# Patient Record
Sex: FEMALE | Race: WHITE | NOT HISPANIC OR LATINO
[De-identification: names, ages, dates, MRNs, and addresses within clinical notes are randomized per-mention and may not be internally consistent; named-entity substitution may affect disease eponyms.]

---

## 2018-06-20 ENCOUNTER — TRANSCRIPTION ENCOUNTER (OUTPATIENT)
Age: 33
End: 2018-06-20

## 2019-01-24 ENCOUNTER — APPOINTMENT (OUTPATIENT)
Dept: INTERNAL MEDICINE | Facility: CLINIC | Age: 34
End: 2019-01-24
Payer: COMMERCIAL

## 2019-01-24 VITALS
OXYGEN SATURATION: 99 % | DIASTOLIC BLOOD PRESSURE: 76 MMHG | HEART RATE: 74 BPM | SYSTOLIC BLOOD PRESSURE: 120 MMHG | TEMPERATURE: 98.2 F

## 2019-01-24 DIAGNOSIS — J00 ACUTE NASOPHARYNGITIS [COMMON COLD]: ICD-10-CM

## 2019-01-24 PROCEDURE — 99213 OFFICE O/P EST LOW 20 MIN: CPT

## 2019-01-25 ENCOUNTER — RX RENEWAL (OUTPATIENT)
Age: 34
End: 2019-01-25

## 2019-01-29 PROBLEM — J00 COLD VIRUS: Status: ACTIVE | Noted: 2019-01-29

## 2019-01-29 NOTE — PLAN
[FreeTextEntry1] : No need for antibiotics at this time. Patient is to rest and hydrate. Call if symptoms worsen.

## 2019-01-29 NOTE — HISTORY OF PRESENT ILLNESS
[FreeTextEntry1] : sinus pressure  [de-identified] : Patient had a three-day history of sinus pressure. Mild congestion mild cough. No fever no body aches or chills.

## 2019-03-15 ENCOUNTER — RX RENEWAL (OUTPATIENT)
Age: 34
End: 2019-03-15

## 2019-04-08 ENCOUNTER — RECORD ABSTRACTING (OUTPATIENT)
Age: 34
End: 2019-04-08

## 2019-04-08 DIAGNOSIS — M54.42 LUMBAGO WITH SCIATICA, LEFT SIDE: ICD-10-CM

## 2019-04-08 DIAGNOSIS — Z82.49 FAMILY HISTORY OF ISCHEMIC HEART DISEASE AND OTHER DISEASES OF THE CIRCULATORY SYSTEM: ICD-10-CM

## 2019-04-08 DIAGNOSIS — Z80.0 FAMILY HISTORY OF MALIGNANT NEOPLASM OF DIGESTIVE ORGANS: ICD-10-CM

## 2019-04-08 DIAGNOSIS — G43.909 MIGRAINE, UNSPECIFIED, NOT INTRACTABLE, W/OUT STATUS MIGRAINOSUS: ICD-10-CM

## 2019-04-09 ENCOUNTER — APPOINTMENT (OUTPATIENT)
Dept: INTERNAL MEDICINE | Facility: CLINIC | Age: 34
End: 2019-04-09
Payer: COMMERCIAL

## 2019-04-09 VITALS
HEART RATE: 76 BPM | DIASTOLIC BLOOD PRESSURE: 90 MMHG | TEMPERATURE: 98.9 F | OXYGEN SATURATION: 99 % | SYSTOLIC BLOOD PRESSURE: 140 MMHG

## 2019-04-09 PROCEDURE — 99214 OFFICE O/P EST MOD 30 MIN: CPT

## 2019-04-10 NOTE — PHYSICAL EXAM
[No Acute Distress] : no acute distress [Well Nourished] : well nourished [Well Developed] : well developed [Well-Appearing] : well-appearing [Normal Sclera/Conjunctiva] : normal sclera/conjunctiva [PERRL] : pupils equal round and reactive to light [EOMI] : extraocular movements intact [Normal Outer Ear/Nose] : the outer ears and nose were normal in appearance [Normal Oropharynx] : the oropharynx was normal [No JVD] : no jugular venous distention [Supple] : supple [No Lymphadenopathy] : no lymphadenopathy [No Respiratory Distress] : no respiratory distress  [Thyroid Normal, No Nodules] : the thyroid was normal and there were no nodules present [No Accessory Muscle Use] : no accessory muscle use [Clear to Auscultation] : lungs were clear to auscultation bilaterally [Normal Rate] : normal rate  [Regular Rhythm] : with a regular rhythm [No Murmur] : no murmur heard [Normal S1, S2] : normal S1 and S2 [No Carotid Bruits] : no carotid bruits [No Abdominal Bruit] : a ~M bruit was not heard ~T in the abdomen [Pedal Pulses Present] : the pedal pulses are present [No Varicosities] : no varicosities [No Edema] : there was no peripheral edema [No Palpable Aorta] : no palpable aorta [No Extremity Clubbing/Cyanosis] : no extremity clubbing/cyanosis [Non Tender] : non-tender [Soft] : abdomen soft [No Masses] : no abdominal mass palpated [Non-distended] : non-distended [No HSM] : no HSM [Normal Bowel Sounds] : normal bowel sounds [Normal Anterior Cervical Nodes] : no anterior cervical lymphadenopathy [Normal Posterior Cervical Nodes] : no posterior cervical lymphadenopathy [No CVA Tenderness] : no CVA  tenderness [No Spinal Tenderness] : no spinal tenderness [No Joint Swelling] : no joint swelling [No Rash] : no rash [Grossly Normal Strength/Tone] : grossly normal strength/tone [Normal Gait] : normal gait [Coordination Grossly Intact] : coordination grossly intact [Deep Tendon Reflexes (DTR)] : deep tendon reflexes were 2+ and symmetric [No Focal Deficits] : no focal deficits [Normal Insight/Judgement] : insight and judgment were intact [Normal Affect] : the affect was normal

## 2019-04-10 NOTE — HISTORY OF PRESENT ILLNESS
[FreeTextEntry1] : MEDS [de-identified] : patient is here for medical renewal and change. She no longer wishes to be on labetalol. She is no longer breast feeding

## 2020-01-10 ENCOUNTER — RX RENEWAL (OUTPATIENT)
Age: 35
End: 2020-01-10

## 2020-01-10 RX ORDER — LABETALOL HYDROCHLORIDE 200 MG/1
200 TABLET, FILM COATED ORAL
Qty: 90 | Refills: 0 | Status: DISCONTINUED | COMMUNITY
Start: 2019-03-15 | End: 2020-01-10

## 2020-01-10 RX ORDER — NIFEDIPINE 30 MG/1
30 TABLET, EXTENDED RELEASE ORAL DAILY
Qty: 1 | Refills: 0 | Status: DISCONTINUED | COMMUNITY
End: 2020-01-10

## 2020-01-13 ENCOUNTER — APPOINTMENT (OUTPATIENT)
Dept: INTERNAL MEDICINE | Facility: CLINIC | Age: 35
End: 2020-01-13
Payer: MEDICAID

## 2020-01-13 VITALS
TEMPERATURE: 98.2 F | DIASTOLIC BLOOD PRESSURE: 70 MMHG | WEIGHT: 233 LBS | BODY MASS INDEX: 37.45 KG/M2 | OXYGEN SATURATION: 98 % | HEIGHT: 66 IN | SYSTOLIC BLOOD PRESSURE: 130 MMHG | HEART RATE: 78 BPM

## 2020-01-13 VITALS — HEART RATE: 68 BPM | DIASTOLIC BLOOD PRESSURE: 82 MMHG | SYSTOLIC BLOOD PRESSURE: 135 MMHG

## 2020-01-13 PROCEDURE — 99213 OFFICE O/P EST LOW 20 MIN: CPT

## 2020-01-13 NOTE — HISTORY OF PRESENT ILLNESS
[FreeTextEntry1] : 34-year-old female here for blood pressure check. [de-identified] : The patient has been on blood pressure medications for the past 15 months, since developing high blood pressure late in her pregnancy.  She has been on the current 3 medications since that time with good control of her blood pressure.  She has had no cardiovascular symptoms, even with exertion.

## 2020-04-06 ENCOUNTER — RX RENEWAL (OUTPATIENT)
Age: 35
End: 2020-04-06

## 2020-07-21 ENCOUNTER — RX RENEWAL (OUTPATIENT)
Age: 35
End: 2020-07-21

## 2020-07-23 ENCOUNTER — RX RENEWAL (OUTPATIENT)
Age: 35
End: 2020-07-23

## 2020-07-27 ENCOUNTER — RX RENEWAL (OUTPATIENT)
Age: 35
End: 2020-07-27

## 2020-12-04 ENCOUNTER — APPOINTMENT (OUTPATIENT)
Dept: INTERNAL MEDICINE | Facility: CLINIC | Age: 35
End: 2020-12-04
Payer: COMMERCIAL

## 2020-12-04 VITALS
HEIGHT: 66 IN | WEIGHT: 224 LBS | DIASTOLIC BLOOD PRESSURE: 70 MMHG | OXYGEN SATURATION: 98 % | BODY MASS INDEX: 36 KG/M2 | SYSTOLIC BLOOD PRESSURE: 120 MMHG | HEART RATE: 72 BPM

## 2020-12-04 DIAGNOSIS — E55.9 VITAMIN D DEFICIENCY, UNSPECIFIED: ICD-10-CM

## 2020-12-04 PROCEDURE — 36415 COLL VENOUS BLD VENIPUNCTURE: CPT

## 2020-12-04 PROCEDURE — 99072 ADDL SUPL MATRL&STAF TM PHE: CPT

## 2020-12-04 PROCEDURE — 99395 PREV VISIT EST AGE 18-39: CPT | Mod: 25

## 2020-12-04 PROCEDURE — G0442 ANNUAL ALCOHOL SCREEN 15 MIN: CPT

## 2020-12-04 PROCEDURE — G0444 DEPRESSION SCREEN ANNUAL: CPT

## 2020-12-10 PROBLEM — E55.9 MILD VITAMIN D DEFICIENCY: Status: ACTIVE | Noted: 2020-12-04

## 2020-12-11 LAB
25(OH)D3 SERPL-MCNC: 18.5 NG/ML
ALBUMIN SERPL ELPH-MCNC: 4.6 G/DL
ALP BLD-CCNC: 70 U/L
ALT SERPL-CCNC: 12 U/L
ANION GAP SERPL CALC-SCNC: 9 MMOL/L
AST SERPL-CCNC: 14 U/L
BASOPHILS # BLD AUTO: 0.06 K/UL
BASOPHILS NFR BLD AUTO: 0.6 %
BILIRUB SERPL-MCNC: <0.2 MG/DL
BUN SERPL-MCNC: 10 MG/DL
CALCIUM SERPL-MCNC: 9.7 MG/DL
CHLORIDE SERPL-SCNC: 106 MMOL/L
CHOLEST SERPL-MCNC: 205 MG/DL
CO2 SERPL-SCNC: 23 MMOL/L
CREAT SERPL-MCNC: 0.62 MG/DL
EOSINOPHIL # BLD AUTO: 0.18 K/UL
EOSINOPHIL NFR BLD AUTO: 1.9 %
ESTIMATED AVERAGE GLUCOSE: 120 MG/DL
GLUCOSE SERPL-MCNC: 94 MG/DL
HBA1C MFR BLD HPLC: 5.8 %
HCT VFR BLD CALC: 43.9 %
HDLC SERPL-MCNC: 56 MG/DL
HGB BLD-MCNC: 13.4 G/DL
IMM GRANULOCYTES NFR BLD AUTO: 0.2 %
LDLC SERPL CALC-MCNC: 128 MG/DL
LYMPHOCYTES # BLD AUTO: 3.12 K/UL
LYMPHOCYTES NFR BLD AUTO: 32.2 %
MAN DIFF?: NORMAL
MCHC RBC-ENTMCNC: 26.1 PG
MCHC RBC-ENTMCNC: 30.5 GM/DL
MCV RBC AUTO: 85.4 FL
MONOCYTES # BLD AUTO: 0.84 K/UL
MONOCYTES NFR BLD AUTO: 8.7 %
NEUTROPHILS # BLD AUTO: 5.48 K/UL
NEUTROPHILS NFR BLD AUTO: 56.4 %
NONHDLC SERPL-MCNC: 150 MG/DL
PLATELET # BLD AUTO: 355 K/UL
POTASSIUM SERPL-SCNC: 3.8 MMOL/L
PROT SERPL-MCNC: 7.4 G/DL
RBC # BLD: 5.14 M/UL
RBC # FLD: 14.8 %
SARS-COV-2 IGG SERPL IA-ACNC: 0.08 INDEX
SARS-COV-2 IGG SERPL QL IA: NEGATIVE
SODIUM SERPL-SCNC: 139 MMOL/L
T4 SERPL-MCNC: 5.9 UG/DL
TRIGL SERPL-MCNC: 108 MG/DL
TSH SERPL-ACNC: 1.42 UIU/ML
VIT B12 SERPL-MCNC: 570 PG/ML
WBC # FLD AUTO: 9.7 K/UL

## 2021-01-13 ENCOUNTER — APPOINTMENT (OUTPATIENT)
Dept: OBGYN | Facility: CLINIC | Age: 36
End: 2021-01-13

## 2021-01-29 ENCOUNTER — APPOINTMENT (OUTPATIENT)
Dept: INTERNAL MEDICINE | Facility: CLINIC | Age: 36
End: 2021-01-29
Payer: COMMERCIAL

## 2021-01-29 VITALS
DIASTOLIC BLOOD PRESSURE: 78 MMHG | HEIGHT: 66 IN | WEIGHT: 224 LBS | SYSTOLIC BLOOD PRESSURE: 120 MMHG | BODY MASS INDEX: 36 KG/M2 | OXYGEN SATURATION: 98 % | HEART RATE: 76 BPM

## 2021-01-29 PROCEDURE — 99214 OFFICE O/P EST MOD 30 MIN: CPT | Mod: 25

## 2021-01-29 PROCEDURE — 36415 COLL VENOUS BLD VENIPUNCTURE: CPT

## 2021-01-29 PROCEDURE — 99072 ADDL SUPL MATRL&STAF TM PHE: CPT

## 2021-02-03 ENCOUNTER — TRANSCRIPTION ENCOUNTER (OUTPATIENT)
Age: 36
End: 2021-02-03

## 2021-02-03 LAB
ALBUMIN SERPL ELPH-MCNC: 4.6 G/DL
ALP BLD-CCNC: 92 U/L
ALT SERPL-CCNC: 15 U/L
ANION GAP SERPL CALC-SCNC: 18 MMOL/L
AST SERPL-CCNC: 11 U/L
BASOPHILS # BLD AUTO: 0.08 K/UL
BASOPHILS NFR BLD AUTO: 0.6 %
BILIRUB SERPL-MCNC: 0.2 MG/DL
BUN SERPL-MCNC: 12 MG/DL
CALCIUM SERPL-MCNC: 10.1 MG/DL
CHLORIDE SERPL-SCNC: 101 MMOL/L
CO2 SERPL-SCNC: 22 MMOL/L
CORTICOSTEROID BIND GLOBULIN: 3 MG/DL
CORTIS SERPL-MCNC: 15 UG/DL
CORTISOL, FREE: 0.8 UG/DL
CREAT SERPL-MCNC: 0.78 MG/DL
EOSINOPHIL # BLD AUTO: 0.11 K/UL
EOSINOPHIL NFR BLD AUTO: 0.8 %
GLUCOSE SERPL-MCNC: 104 MG/DL
HCT VFR BLD CALC: 46 %
HGB BLD-MCNC: 14.2 G/DL
IMM GRANULOCYTES NFR BLD AUTO: 0.3 %
LYMPHOCYTES # BLD AUTO: 3.56 K/UL
LYMPHOCYTES NFR BLD AUTO: 27.4 %
MAN DIFF?: NORMAL
MCHC RBC-ENTMCNC: 26.1 PG
MCHC RBC-ENTMCNC: 30.9 GM/DL
MCV RBC AUTO: 84.6 FL
MONOCYTES # BLD AUTO: 1.11 K/UL
MONOCYTES NFR BLD AUTO: 8.6 %
NEUTROPHILS # BLD AUTO: 8.08 K/UL
NEUTROPHILS NFR BLD AUTO: 62.3 %
PFCX: 5.3 %
PLATELET # BLD AUTO: 410 K/UL
POTASSIUM SERPL-SCNC: 4 MMOL/L
PROT SERPL-MCNC: 8.1 G/DL
RBC # BLD: 5.44 M/UL
RBC # FLD: 15.1 %
SODIUM SERPL-SCNC: 140 MMOL/L
TSH SERPL-ACNC: 1.81 UIU/ML
WBC # FLD AUTO: 12.98 K/UL

## 2021-02-05 ENCOUNTER — APPOINTMENT (OUTPATIENT)
Dept: INTERNAL MEDICINE | Facility: CLINIC | Age: 36
End: 2021-02-05
Payer: COMMERCIAL

## 2021-02-05 VITALS
HEIGHT: 66 IN | SYSTOLIC BLOOD PRESSURE: 130 MMHG | OXYGEN SATURATION: 98 % | DIASTOLIC BLOOD PRESSURE: 90 MMHG | BODY MASS INDEX: 36 KG/M2 | WEIGHT: 224 LBS | HEART RATE: 78 BPM

## 2021-02-05 DIAGNOSIS — J34.89 OTHER SPECIFIED DISORDERS OF NOSE AND NASAL SINUSES: ICD-10-CM

## 2021-02-05 DIAGNOSIS — G43.109 MIGRAINE WITH AURA, NOT INTRACTABLE, W/OUT STATUS MIGRAINOSUS: ICD-10-CM

## 2021-02-05 PROCEDURE — 99214 OFFICE O/P EST MOD 30 MIN: CPT

## 2021-02-05 PROCEDURE — 99072 ADDL SUPL MATRL&STAF TM PHE: CPT

## 2021-02-08 PROBLEM — G43.109 MIGRAINE WITH VERTIGO: Status: ACTIVE | Noted: 2021-01-29

## 2021-02-08 LAB
ALBUMIN SERPL ELPH-MCNC: 4.7 G/DL
ALP BLD-CCNC: 83 U/L
ALT SERPL-CCNC: 13 U/L
ANION GAP SERPL CALC-SCNC: 15 MMOL/L
AST SERPL-CCNC: 13 U/L
BASOPHILS # BLD AUTO: 0.07 K/UL
BASOPHILS NFR BLD AUTO: 0.6 %
BILIRUB SERPL-MCNC: 0.2 MG/DL
BUN SERPL-MCNC: 13 MG/DL
CALCIUM SERPL-MCNC: 9.8 MG/DL
CHLORIDE SERPL-SCNC: 108 MMOL/L
CO2 SERPL-SCNC: 16 MMOL/L
CREAT SERPL-MCNC: 0.76 MG/DL
EOSINOPHIL # BLD AUTO: 0.2 K/UL
EOSINOPHIL NFR BLD AUTO: 1.7 %
GLUCOSE SERPL-MCNC: 96 MG/DL
HCT VFR BLD CALC: 46.9 %
HGB BLD-MCNC: 14.5 G/DL
IMM GRANULOCYTES NFR BLD AUTO: 0.3 %
LYMPHOCYTES # BLD AUTO: 3.57 K/UL
LYMPHOCYTES NFR BLD AUTO: 30.3 %
MAN DIFF?: NORMAL
MCHC RBC-ENTMCNC: 26.3 PG
MCHC RBC-ENTMCNC: 30.9 GM/DL
MCV RBC AUTO: 85 FL
MONOCYTES # BLD AUTO: 0.74 K/UL
MONOCYTES NFR BLD AUTO: 6.3 %
NEUTROPHILS # BLD AUTO: 7.17 K/UL
NEUTROPHILS NFR BLD AUTO: 60.8 %
PLATELET # BLD AUTO: 394 K/UL
POTASSIUM SERPL-SCNC: 4.1 MMOL/L
PROT SERPL-MCNC: 7.7 G/DL
RBC # BLD: 5.52 M/UL
RBC # FLD: 15.2 %
SARS-COV-2 N GENE NPH QL NAA+PROBE: NOT DETECTED
SODIUM SERPL-SCNC: 140 MMOL/L
WBC # FLD AUTO: 11.78 K/UL

## 2021-02-21 ENCOUNTER — RESULT REVIEW (OUTPATIENT)
Age: 36
End: 2021-02-21

## 2021-03-01 ENCOUNTER — APPOINTMENT (OUTPATIENT)
Dept: INTERNAL MEDICINE | Facility: CLINIC | Age: 36
End: 2021-03-01
Payer: COMMERCIAL

## 2021-03-01 DIAGNOSIS — D72.829 ELEVATED WHITE BLOOD CELL COUNT, UNSPECIFIED: ICD-10-CM

## 2021-03-01 PROCEDURE — 36415 COLL VENOUS BLD VENIPUNCTURE: CPT

## 2021-03-01 PROCEDURE — 99072 ADDL SUPL MATRL&STAF TM PHE: CPT

## 2021-03-02 PROBLEM — D72.829 ELEVATED WBC COUNT: Status: ACTIVE | Noted: 2021-02-08

## 2021-03-03 LAB
BASOPHILS # BLD AUTO: 0.04 K/UL
BASOPHILS NFR BLD AUTO: 0.4 %
EOSINOPHIL # BLD AUTO: 0.23 K/UL
EOSINOPHIL NFR BLD AUTO: 2.1 %
HCT VFR BLD CALC: 44.3 %
HGB BLD-MCNC: 13.8 G/DL
IMM GRANULOCYTES NFR BLD AUTO: 0.3 %
LYMPHOCYTES # BLD AUTO: 3.12 K/UL
LYMPHOCYTES NFR BLD AUTO: 29 %
MAN DIFF?: NORMAL
MCHC RBC-ENTMCNC: 26.2 PG
MCHC RBC-ENTMCNC: 31.2 GM/DL
MCV RBC AUTO: 84.1 FL
MONOCYTES # BLD AUTO: 1.11 K/UL
MONOCYTES NFR BLD AUTO: 10.3 %
NEUTROPHILS # BLD AUTO: 6.23 K/UL
NEUTROPHILS NFR BLD AUTO: 57.9 %
PLATELET # BLD AUTO: 414 K/UL
RBC # BLD: 5.27 M/UL
RBC # FLD: 15.3 %
SARS-COV-2 IGG SERPL IA-ACNC: <0.1 INDEX
SARS-COV-2 IGG SERPL QL IA: NEGATIVE
WBC # FLD AUTO: 10.76 K/UL

## 2021-03-26 ENCOUNTER — APPOINTMENT (OUTPATIENT)
Dept: OBGYN | Facility: CLINIC | Age: 36
End: 2021-03-26
Payer: COMMERCIAL

## 2021-03-26 ENCOUNTER — NON-APPOINTMENT (OUTPATIENT)
Age: 36
End: 2021-03-26

## 2021-03-26 VITALS
HEIGHT: 66 IN | SYSTOLIC BLOOD PRESSURE: 160 MMHG | DIASTOLIC BLOOD PRESSURE: 80 MMHG | WEIGHT: 226 LBS | BODY MASS INDEX: 36.32 KG/M2 | TEMPERATURE: 97.9 F

## 2021-03-26 DIAGNOSIS — Z01.419 ENCOUNTER FOR GYNECOLOGICAL EXAMINATION (GENERAL) (ROUTINE) W/OUT ABNORMAL FINDINGS: ICD-10-CM

## 2021-03-26 PROCEDURE — 99072 ADDL SUPL MATRL&STAF TM PHE: CPT

## 2021-03-26 PROCEDURE — 99395 PREV VISIT EST AGE 18-39: CPT

## 2021-03-26 NOTE — HISTORY OF PRESENT ILLNESS
[TextBox_4] : 37yo P1 here for annual gyn exam.  She was last seen at postpartum exam.\par Previous c/s at 32wks at [facility] in 2018.  Baby boy, now 1yo and s/p cranial surgery for fused cranium, now doing well.\par Reg periods.\par Still working on BP control- takes nifed, HCTZ, metoprolol and working with Dr. Booker.  She is trying to exercise more and lose weight.  Plans to change to labetalol with nifed when pregn occurs.  Asking is Clomid would help her conceive sooner.\par \par She is ; stays at home with her son.

## 2021-03-26 NOTE — PLAN
[FreeTextEntry1] : Discussing timing of ovulation.  Will r/t in 3mo if not pregnant to discuss.  Enc weight loss and exercise.

## 2021-04-12 RX ORDER — METOPROLOL SUCCINATE 50 MG/1
50 TABLET, EXTENDED RELEASE ORAL DAILY
Qty: 90 | Refills: 0 | Status: DISCONTINUED | COMMUNITY
Start: 2019-04-09 | End: 2021-04-12

## 2021-04-26 ENCOUNTER — RX RENEWAL (OUTPATIENT)
Age: 36
End: 2021-04-26

## 2021-05-05 ENCOUNTER — NON-APPOINTMENT (OUTPATIENT)
Age: 36
End: 2021-05-05

## 2021-05-07 ENCOUNTER — APPOINTMENT (OUTPATIENT)
Dept: OBGYN | Facility: CLINIC | Age: 36
End: 2021-05-07
Payer: COMMERCIAL

## 2021-05-07 ENCOUNTER — ASOB RESULT (OUTPATIENT)
Age: 36
End: 2021-05-07

## 2021-05-07 DIAGNOSIS — E66.9 OBESITY, UNSPECIFIED: ICD-10-CM

## 2021-05-07 PROCEDURE — 99214 OFFICE O/P EST MOD 30 MIN: CPT

## 2021-05-07 PROCEDURE — 99072 ADDL SUPL MATRL&STAF TM PHE: CPT

## 2021-05-07 PROCEDURE — 76817 TRANSVAGINAL US OBSTETRIC: CPT

## 2021-05-07 NOTE — PLAN
[FreeTextEntry1] : EDC consistent with LMP 12/18/21. Discussed possibility of transfer again and suggested initiating care at [facility] [place] or [facility] to remove that layer of stress. Pt will consider but not inclined at this time. Discussed need for ASA as well. Referral for FTS and OB packet given. Return in 4 weeks for new OB visit. Pt wants NIPT.

## 2021-05-07 NOTE — HISTORY OF PRESENT ILLNESS
[FreeTextEntry1] : 36 year old  at 7w6d by LMP presents for pregnancy confirmation. Has nausea and limited appetite but no vomiting. Pregnancy is desired. Has history of chronic hypertension for which she is taking nifedipine, hydrochlorthiazide and labetalol. She had a   delivery at 32 weeks at [facility] for superimposed pre-eclampsia in 2018. She has noted weight loss in the pregnancy so far from 224 lbs to 208 lbs due to decreased PO intake.

## 2021-06-10 ENCOUNTER — TRANSCRIPTION ENCOUNTER (OUTPATIENT)
Age: 36
End: 2021-06-10

## 2021-06-11 ENCOUNTER — APPOINTMENT (OUTPATIENT)
Dept: OBGYN | Facility: CLINIC | Age: 36
End: 2021-06-11
Payer: COMMERCIAL

## 2021-06-11 ENCOUNTER — NON-APPOINTMENT (OUTPATIENT)
Age: 36
End: 2021-06-11

## 2021-06-11 VITALS
HEIGHT: 66 IN | WEIGHT: 213 LBS | SYSTOLIC BLOOD PRESSURE: 124 MMHG | BODY MASS INDEX: 34.23 KG/M2 | DIASTOLIC BLOOD PRESSURE: 80 MMHG | TEMPERATURE: 97.7 F

## 2021-06-11 DIAGNOSIS — Z11.59 ENCOUNTER FOR SCREENING FOR OTHER VIRAL DISEASES: ICD-10-CM

## 2021-06-11 LAB
CYTOLOGY CVX/VAG DOC THIN PREP: NORMAL
HPV HIGH+LOW RISK DNA PNL CVX: NOT DETECTED

## 2021-06-11 PROCEDURE — 0500F INITIAL PRENATAL CARE VISIT: CPT

## 2021-06-14 ENCOUNTER — APPOINTMENT (OUTPATIENT)
Dept: OBGYN | Facility: CLINIC | Age: 36
End: 2021-06-14

## 2021-06-14 LAB
APPEARANCE: CLEAR
BACTERIA UR CULT: NORMAL
BACTERIA: NEGATIVE
BASOPHILS # BLD AUTO: 0.04 K/UL
BASOPHILS NFR BLD AUTO: 0.4 %
BILIRUBIN URINE: NEGATIVE
BLOOD URINE: NEGATIVE
C TRACH RRNA SPEC QL NAA+PROBE: NOT DETECTED
COLOR: YELLOW
COVID-19 NUCLEOCAPSID  GAM ANTIBODY INTERPRETATION: NEGATIVE
EOSINOPHIL # BLD AUTO: 0.11 K/UL
EOSINOPHIL NFR BLD AUTO: 1.1 %
GLUCOSE QUALITATIVE U: NEGATIVE
HBV SURFACE AG SER QL: NONREACTIVE
HCT VFR BLD CALC: 40.1 %
HGB BLD-MCNC: 12.6 G/DL
HIV1+2 AB SPEC QL IA.RAPID: NONREACTIVE
HYALINE CASTS: 1 /LPF
IMM GRANULOCYTES NFR BLD AUTO: 0.2 %
KETONES URINE: NEGATIVE
LEUKOCYTE ESTERASE URINE: NEGATIVE
LYMPHOCYTES # BLD AUTO: 2.06 K/UL
LYMPHOCYTES NFR BLD AUTO: 21.2 %
MAN DIFF?: NORMAL
MCHC RBC-ENTMCNC: 26.5 PG
MCHC RBC-ENTMCNC: 31.4 GM/DL
MCV RBC AUTO: 84.4 FL
MEV IGG FLD QL IA: 192 AU/ML
MEV IGG+IGM SER-IMP: POSITIVE
MICROSCOPIC-UA: NORMAL
MONOCYTES # BLD AUTO: 0.83 K/UL
MONOCYTES NFR BLD AUTO: 8.5 %
N GONORRHOEA RRNA SPEC QL NAA+PROBE: NOT DETECTED
NEUTROPHILS # BLD AUTO: 6.65 K/UL
NEUTROPHILS NFR BLD AUTO: 68.6 %
NITRITE URINE: NEGATIVE
PH URINE: 5
PLATELET # BLD AUTO: 377 K/UL
PROTEIN URINE: NEGATIVE
RBC # BLD: 4.75 M/UL
RBC # FLD: 15.1 %
RED BLOOD CELLS URINE: 1 /HPF
RUBV IGG FLD-ACNC: 4.8 INDEX
RUBV IGG SER-IMP: POSITIVE
SARS-COV-2 AB SERPL QL IA: 0.1 INDEX
SOURCE AMPLIFICATION: NORMAL
SPECIFIC GRAVITY URINE: 1.02
SQUAMOUS EPITHELIAL CELLS: 2 /HPF
T PALLIDUM AB SER QL IA: NEGATIVE
UROBILINOGEN URINE: NORMAL
WBC # FLD AUTO: 9.71 K/UL
WHITE BLOOD CELLS URINE: 1 /HPF

## 2021-06-15 ENCOUNTER — RX RENEWAL (OUTPATIENT)
Age: 36
End: 2021-06-15

## 2021-06-22 ENCOUNTER — NON-APPOINTMENT (OUTPATIENT)
Age: 36
End: 2021-06-22

## 2021-06-23 ENCOUNTER — NON-APPOINTMENT (OUTPATIENT)
Age: 36
End: 2021-06-23

## 2021-07-15 ENCOUNTER — NON-APPOINTMENT (OUTPATIENT)
Age: 36
End: 2021-07-15

## 2021-07-15 ENCOUNTER — APPOINTMENT (OUTPATIENT)
Dept: OBGYN | Facility: CLINIC | Age: 36
End: 2021-07-15
Payer: COMMERCIAL

## 2021-07-15 VITALS
TEMPERATURE: 97.7 F | BODY MASS INDEX: 33.59 KG/M2 | WEIGHT: 209 LBS | HEIGHT: 66 IN | DIASTOLIC BLOOD PRESSURE: 80 MMHG | SYSTOLIC BLOOD PRESSURE: 120 MMHG

## 2021-07-15 PROCEDURE — 0502F SUBSEQUENT PRENATAL CARE: CPT

## 2021-07-15 PROCEDURE — 36415 COLL VENOUS BLD VENIPUNCTURE: CPT

## 2021-07-27 ENCOUNTER — RX RENEWAL (OUTPATIENT)
Age: 36
End: 2021-07-27

## 2021-08-09 ENCOUNTER — NON-APPOINTMENT (OUTPATIENT)
Age: 36
End: 2021-08-09

## 2021-08-13 ENCOUNTER — APPOINTMENT (OUTPATIENT)
Dept: OBGYN | Facility: CLINIC | Age: 36
End: 2021-08-13
Payer: COMMERCIAL

## 2021-08-13 VITALS
WEIGHT: 214 LBS | BODY MASS INDEX: 34.39 KG/M2 | SYSTOLIC BLOOD PRESSURE: 120 MMHG | TEMPERATURE: 98.6 F | DIASTOLIC BLOOD PRESSURE: 80 MMHG | HEIGHT: 66 IN

## 2021-08-13 PROCEDURE — 0502F SUBSEQUENT PRENATAL CARE: CPT

## 2021-08-19 LAB
2ND TRIMESTER DATA: NORMAL
AFP PNL SERPL: NORMAL
AFP SERPL-ACNC: NORMAL
BILIRUB UR QL STRIP: NORMAL
CLINICAL BIOCHEMIST REVIEW: NORMAL
GLUCOSE UR-MCNC: NORMAL
HCG UR QL: 0.2 EU/DL
HGB UR QL STRIP.AUTO: NORMAL
KETONES UR-MCNC: NORMAL
LEUKOCYTE ESTERASE UR QL STRIP: NORMAL
NITRITE UR QL STRIP: NORMAL
NOTES NTD: NORMAL
PH UR STRIP: 7.5
PROT UR STRIP-MCNC: NORMAL
SP GR UR STRIP: 1.03

## 2021-09-03 ENCOUNTER — APPOINTMENT (OUTPATIENT)
Dept: OBGYN | Facility: CLINIC | Age: 36
End: 2021-09-03
Payer: COMMERCIAL

## 2021-09-03 ENCOUNTER — NON-APPOINTMENT (OUTPATIENT)
Age: 36
End: 2021-09-03

## 2021-09-03 VITALS
SYSTOLIC BLOOD PRESSURE: 110 MMHG | BODY MASS INDEX: 35.03 KG/M2 | DIASTOLIC BLOOD PRESSURE: 70 MMHG | WEIGHT: 218 LBS | HEIGHT: 66 IN

## 2021-09-03 LAB
BILIRUB UR QL STRIP: NEGATIVE
GLUCOSE UR-MCNC: NEGATIVE
HCG UR QL: 0.2 EU/DL
HGB UR QL STRIP.AUTO: NEGATIVE
KETONES UR-MCNC: NEGATIVE
LEUKOCYTE ESTERASE UR QL STRIP: NEGATIVE
NITRITE UR QL STRIP: NEGATIVE
PH UR STRIP: 5.5
PROT UR STRIP-MCNC: NEGATIVE
SP GR UR STRIP: 1.01

## 2021-09-03 PROCEDURE — 0502F SUBSEQUENT PRENATAL CARE: CPT

## 2021-09-03 PROCEDURE — 81015 MICROSCOPIC EXAM OF URINE: CPT

## 2021-09-04 LAB
BASOPHILS # BLD AUTO: 0.03 K/UL
BASOPHILS NFR BLD AUTO: 0.3 %
EOSINOPHIL # BLD AUTO: 0.09 K/UL
EOSINOPHIL NFR BLD AUTO: 0.8 %
GLUCOSE 1H P 50 G GLC PO SERPL-MCNC: 112 MG/DL
HCT VFR BLD CALC: 37 %
HGB BLD-MCNC: 11.8 G/DL
IMM GRANULOCYTES NFR BLD AUTO: 0.4 %
LYMPHOCYTES # BLD AUTO: 1.7 K/UL
LYMPHOCYTES NFR BLD AUTO: 15.7 %
MAN DIFF?: NORMAL
MCHC RBC-ENTMCNC: 27 PG
MCHC RBC-ENTMCNC: 31.9 GM/DL
MCV RBC AUTO: 84.7 FL
MONOCYTES # BLD AUTO: 0.74 K/UL
MONOCYTES NFR BLD AUTO: 6.9 %
NEUTROPHILS # BLD AUTO: 8.2 K/UL
NEUTROPHILS NFR BLD AUTO: 75.9 %
PLATELET # BLD AUTO: 339 K/UL
RBC # BLD: 4.37 M/UL
RBC # FLD: 14.4 %
WBC # FLD AUTO: 10.8 K/UL

## 2021-09-20 ENCOUNTER — NON-APPOINTMENT (OUTPATIENT)
Age: 36
End: 2021-09-20

## 2021-09-27 ENCOUNTER — APPOINTMENT (OUTPATIENT)
Dept: OBGYN | Facility: CLINIC | Age: 36
End: 2021-09-27
Payer: COMMERCIAL

## 2021-09-27 VITALS
WEIGHT: 220 LBS | DIASTOLIC BLOOD PRESSURE: 70 MMHG | TEMPERATURE: 98.4 F | SYSTOLIC BLOOD PRESSURE: 120 MMHG | BODY MASS INDEX: 35.36 KG/M2 | HEIGHT: 66 IN

## 2021-09-27 PROCEDURE — 0502F SUBSEQUENT PRENATAL CARE: CPT

## 2021-10-04 ENCOUNTER — APPOINTMENT (OUTPATIENT)
Dept: OBGYN | Facility: CLINIC | Age: 36
End: 2021-10-04

## 2021-10-11 ENCOUNTER — NON-APPOINTMENT (OUTPATIENT)
Age: 36
End: 2021-10-11

## 2021-10-22 ENCOUNTER — APPOINTMENT (OUTPATIENT)
Dept: OBGYN | Facility: CLINIC | Age: 36
End: 2021-10-22
Payer: COMMERCIAL

## 2021-10-22 VITALS
HEIGHT: 66 IN | WEIGHT: 222 LBS | DIASTOLIC BLOOD PRESSURE: 70 MMHG | SYSTOLIC BLOOD PRESSURE: 122 MMHG | BODY MASS INDEX: 35.68 KG/M2

## 2021-10-22 LAB
BILIRUB UR QL STRIP: NORMAL
GLUCOSE UR-MCNC: NORMAL
HCG UR QL: 0.2 EU/DL
HGB UR QL STRIP.AUTO: NORMAL
KETONES UR-MCNC: NORMAL
LEUKOCYTE ESTERASE UR QL STRIP: NORMAL
NITRITE UR QL STRIP: NORMAL
PH UR STRIP: 6
PROT UR STRIP-MCNC: NORMAL
SP GR UR STRIP: 1.01

## 2021-10-22 PROCEDURE — 90715 TDAP VACCINE 7 YRS/> IM: CPT

## 2021-10-22 PROCEDURE — 36415 COLL VENOUS BLD VENIPUNCTURE: CPT

## 2021-10-22 PROCEDURE — 90471 IMMUNIZATION ADMIN: CPT

## 2021-10-22 PROCEDURE — 0502F SUBSEQUENT PRENATAL CARE: CPT

## 2021-10-22 RX ORDER — OXYCODONE AND ACETAMINOPHEN TABLETS 10; 300 MG/1; MG/1
TABLET ORAL
Refills: 0 | Status: DISCONTINUED | COMMUNITY
End: 2021-10-22

## 2021-10-22 RX ORDER — DOXYLAMINE SUCCINATE AND PYRIDOXINE HYDROCHLORIDE 10; 10 MG/1; MG/1
10-10 TABLET, DELAYED RELEASE ORAL
Qty: 60 | Refills: 2 | Status: DISCONTINUED | COMMUNITY
Start: 2021-05-08 | End: 2021-10-22

## 2021-10-22 RX ORDER — HYDROCHLOROTHIAZIDE 12.5 MG/1
12.5 CAPSULE ORAL
Qty: 30 | Refills: 2 | Status: DISCONTINUED | COMMUNITY
Start: 2020-04-06 | End: 2021-10-22

## 2021-10-22 RX ORDER — IBUPROFEN 600 MG/1
600 TABLET, FILM COATED ORAL
Refills: 0 | Status: DISCONTINUED | COMMUNITY
End: 2021-10-22

## 2021-10-22 RX ORDER — ACETAMINOPHEN 325 MG/1
325 TABLET, FILM COATED ORAL
Refills: 0 | Status: DISCONTINUED | COMMUNITY
End: 2021-10-22

## 2021-10-22 RX ORDER — AMOXICILLIN AND CLAVULANATE POTASSIUM 875; 125 MG/1; MG/1
875-125 TABLET, COATED ORAL
Qty: 14 | Refills: 0 | Status: DISCONTINUED | COMMUNITY
Start: 2021-02-05 | End: 2021-10-22

## 2021-10-25 LAB
COVID-19 NUCLEOCAPSID  GAM ANTIBODY INTERPRETATION: NEGATIVE
HIV1+2 AB SPEC QL IA.RAPID: NONREACTIVE
SARS-COV-2 AB SERPL QL IA: 0.07 INDEX
T PALLIDUM AB SER QL IA: NEGATIVE

## 2021-11-01 ENCOUNTER — APPOINTMENT (OUTPATIENT)
Dept: OBGYN | Facility: CLINIC | Age: 36
End: 2021-11-01

## 2021-11-05 ENCOUNTER — APPOINTMENT (OUTPATIENT)
Dept: OBGYN | Facility: CLINIC | Age: 36
End: 2021-11-05
Payer: COMMERCIAL

## 2021-11-05 VITALS
SYSTOLIC BLOOD PRESSURE: 100 MMHG | BODY MASS INDEX: 36.8 KG/M2 | WEIGHT: 229 LBS | HEIGHT: 66 IN | DIASTOLIC BLOOD PRESSURE: 70 MMHG

## 2021-11-05 LAB
BILIRUB UR QL STRIP: NORMAL
CLARITY UR: CLEAR
COLLECTION METHOD: NORMAL
GLUCOSE UR-MCNC: NEGATIVE
HCG UR QL: 0.2 EU/DL
HGB UR QL STRIP.AUTO: NEGATIVE
KETONES UR-MCNC: NORMAL
LEUKOCYTE ESTERASE UR QL STRIP: NEGATIVE
NITRITE UR QL STRIP: NEGATIVE
PH UR STRIP: 5.5
PROT UR STRIP-MCNC: NORMAL
SP GR UR STRIP: >=1.03

## 2021-11-05 PROCEDURE — 90471 IMMUNIZATION ADMIN: CPT

## 2021-11-05 PROCEDURE — 0502F SUBSEQUENT PRENATAL CARE: CPT

## 2021-11-05 PROCEDURE — 90686 IIV4 VACC NO PRSV 0.5 ML IM: CPT

## 2021-11-16 ENCOUNTER — NON-APPOINTMENT (OUTPATIENT)
Age: 36
End: 2021-11-16

## 2021-11-19 ENCOUNTER — APPOINTMENT (OUTPATIENT)
Dept: OBGYN | Facility: CLINIC | Age: 36
End: 2021-11-19
Payer: COMMERCIAL

## 2021-11-19 ENCOUNTER — NON-APPOINTMENT (OUTPATIENT)
Age: 36
End: 2021-11-19

## 2021-11-19 VITALS
SYSTOLIC BLOOD PRESSURE: 130 MMHG | HEIGHT: 66 IN | BODY MASS INDEX: 37.28 KG/M2 | TEMPERATURE: 98.4 F | DIASTOLIC BLOOD PRESSURE: 80 MMHG | WEIGHT: 232 LBS

## 2021-11-19 LAB
ABO + RH PNL BLD: NORMAL
BILIRUB UR QL STRIP: NORMAL
BLD GP AB SCN SERPL QL: NORMAL
CFTR MUT TESTED BLD/T: NEGATIVE
CLARIM 15Q11.2: NORMAL
CLARIM 1P36: NORMAL
CLARIM 22Q11.2: NORMAL
CLARIM 4P-/WOLF-HIRSCHHORN: NORMAL
CLARIM 5P-/CRI DU CHAT: NORMAL
CLARIM ADDITIONAL INFO: NORMAL
CLARIM CHROMOSOME 13: NORMAL
CLARIM CHROMOSOME 18: NORMAL
CLARIM CHROMOSOME 21: NORMAL
CLARIM SEX CHROMOSOMES: NORMAL
CLARITEST NIPT W/MICRO: NORMAL
GLUCOSE UR-MCNC: NORMAL
HCG UR QL: 0.2 EU/DL
HGB A MFR BLD: 97.5 %
HGB A2 MFR BLD: 2.5 %
HGB FRACT BLD-IMP: NORMAL
HGB UR QL STRIP.AUTO: NORMAL
KETONES UR-MCNC: 15
KETONES UR-MCNC: NORMAL
KETONES UR-MCNC: NORMAL
LEUKOCYTE ESTERASE UR QL STRIP: NORMAL
NITRITE UR QL STRIP: NORMAL
PH UR STRIP: 5
PH UR STRIP: 5
PH UR STRIP: 5.5
PROT UR STRIP-MCNC: NORMAL
SP GR UR STRIP: 1.03

## 2021-11-19 PROCEDURE — 76819 FETAL BIOPHYS PROFIL W/O NST: CPT

## 2021-11-19 PROCEDURE — 0502F SUBSEQUENT PRENATAL CARE: CPT

## 2021-11-22 ENCOUNTER — NON-APPOINTMENT (OUTPATIENT)
Age: 36
End: 2021-11-22

## 2021-11-23 ENCOUNTER — NON-APPOINTMENT (OUTPATIENT)
Age: 36
End: 2021-11-23

## 2021-11-23 ENCOUNTER — APPOINTMENT (OUTPATIENT)
Dept: OBGYN | Facility: CLINIC | Age: 36
End: 2021-11-23
Payer: COMMERCIAL

## 2021-11-23 VITALS
DIASTOLIC BLOOD PRESSURE: 72 MMHG | SYSTOLIC BLOOD PRESSURE: 122 MMHG | WEIGHT: 232 LBS | BODY MASS INDEX: 37.28 KG/M2 | HEIGHT: 66 IN

## 2021-11-23 LAB — B-HEM STREP SPEC QL CULT: NORMAL

## 2021-11-23 PROCEDURE — 81003 URINALYSIS AUTO W/O SCOPE: CPT | Mod: QW

## 2021-11-23 PROCEDURE — 0502F SUBSEQUENT PRENATAL CARE: CPT

## 2021-11-24 ENCOUNTER — NON-APPOINTMENT (OUTPATIENT)
Age: 36
End: 2021-11-24

## 2021-11-26 ENCOUNTER — NON-APPOINTMENT (OUTPATIENT)
Age: 36
End: 2021-11-26

## 2021-11-29 ENCOUNTER — APPOINTMENT (OUTPATIENT)
Dept: OBGYN | Facility: CLINIC | Age: 36
End: 2021-11-29

## 2021-11-30 ENCOUNTER — APPOINTMENT (OUTPATIENT)
Dept: OBGYN | Facility: CLINIC | Age: 36
End: 2021-11-30
Payer: COMMERCIAL

## 2021-11-30 VITALS
SYSTOLIC BLOOD PRESSURE: 120 MMHG | TEMPERATURE: 98.7 F | WEIGHT: 234 LBS | BODY MASS INDEX: 37.61 KG/M2 | HEIGHT: 66 IN | DIASTOLIC BLOOD PRESSURE: 70 MMHG

## 2021-11-30 DIAGNOSIS — Z01.818 ENCOUNTER FOR OTHER PREPROCEDURAL EXAMINATION: ICD-10-CM

## 2021-11-30 LAB
BILIRUB UR QL STRIP: NORMAL
CLARITY UR: CLEAR
COLLECTION METHOD: NORMAL
GLUCOSE UR-MCNC: NORMAL
HCG UR QL: 0.2 EU/DL
HGB UR QL STRIP.AUTO: NORMAL
KETONES UR-MCNC: NORMAL
LEUKOCYTE ESTERASE UR QL STRIP: NORMAL
NITRITE UR QL STRIP: NORMAL
PH UR STRIP: 5.5
PROT UR STRIP-MCNC: NORMAL
SP GR UR STRIP: >=1.03

## 2021-11-30 PROCEDURE — 59025 FETAL NON-STRESS TEST: CPT

## 2021-11-30 PROCEDURE — 0502F SUBSEQUENT PRENATAL CARE: CPT

## 2021-12-02 ENCOUNTER — NON-APPOINTMENT (OUTPATIENT)
Age: 36
End: 2021-12-02

## 2021-12-04 ENCOUNTER — NON-APPOINTMENT (OUTPATIENT)
Age: 36
End: 2021-12-04

## 2021-12-06 ENCOUNTER — NON-APPOINTMENT (OUTPATIENT)
Age: 36
End: 2021-12-06

## 2021-12-08 ENCOUNTER — APPOINTMENT (OUTPATIENT)
Dept: OBGYN | Facility: CLINIC | Age: 36
End: 2021-12-08
Payer: COMMERCIAL

## 2021-12-08 VITALS
DIASTOLIC BLOOD PRESSURE: 80 MMHG | SYSTOLIC BLOOD PRESSURE: 117 MMHG | WEIGHT: 238 LBS | BODY MASS INDEX: 38.25 KG/M2 | HEIGHT: 66 IN

## 2021-12-08 LAB
BILIRUB UR QL STRIP: NORMAL
GLUCOSE UR-MCNC: NORMAL
HCG UR QL: 0.2 EU/DL
HGB UR QL STRIP.AUTO: NORMAL
KETONES UR-MCNC: NORMAL
LEUKOCYTE ESTERASE UR QL STRIP: NORMAL
NITRITE UR QL STRIP: NORMAL
PH UR STRIP: 5
PROT UR STRIP-MCNC: NORMAL
SP GR UR STRIP: 1.02

## 2021-12-08 PROCEDURE — 59025 FETAL NON-STRESS TEST: CPT

## 2021-12-08 PROCEDURE — 0502F SUBSEQUENT PRENATAL CARE: CPT

## 2021-12-11 ENCOUNTER — NON-APPOINTMENT (OUTPATIENT)
Age: 36
End: 2021-12-11

## 2021-12-12 ENCOUNTER — RESULT REVIEW (OUTPATIENT)
Age: 36
End: 2021-12-12

## 2021-12-13 ENCOUNTER — APPOINTMENT (OUTPATIENT)
Dept: OBGYN | Facility: HOSPITAL | Age: 36
End: 2021-12-13
Payer: COMMERCIAL

## 2021-12-13 PROCEDURE — 59510 CESAREAN DELIVERY: CPT | Mod: GC

## 2021-12-15 ENCOUNTER — APPOINTMENT (OUTPATIENT)
Dept: OBGYN | Facility: CLINIC | Age: 36
End: 2021-12-15

## 2021-12-17 ENCOUNTER — APPOINTMENT (OUTPATIENT)
Dept: OBGYN | Facility: CLINIC | Age: 36
End: 2021-12-17

## 2021-12-17 ENCOUNTER — NON-APPOINTMENT (OUTPATIENT)
Age: 36
End: 2021-12-17

## 2021-12-18 LAB
BILIRUB UR QL STRIP: NORMAL
GLUCOSE UR-MCNC: NORMAL
HCG UR QL: 0.2 EU/DL
HGB UR QL STRIP.AUTO: NORMAL
KETONES UR-MCNC: NORMAL
LEUKOCYTE ESTERASE UR QL STRIP: NORMAL
NITRITE UR QL STRIP: NORMAL
PH UR STRIP: 5
PROT UR STRIP-MCNC: NORMAL
SP GR UR STRIP: 1.03

## 2021-12-28 ENCOUNTER — APPOINTMENT (OUTPATIENT)
Dept: INTERNAL MEDICINE | Facility: CLINIC | Age: 36
End: 2021-12-28

## 2022-01-06 ENCOUNTER — APPOINTMENT (OUTPATIENT)
Dept: OBGYN | Facility: CLINIC | Age: 37
End: 2022-01-06
Payer: COMMERCIAL

## 2022-01-06 VITALS
HEIGHT: 66 IN | DIASTOLIC BLOOD PRESSURE: 80 MMHG | BODY MASS INDEX: 35.52 KG/M2 | SYSTOLIC BLOOD PRESSURE: 120 MMHG | WEIGHT: 221 LBS

## 2022-01-06 PROCEDURE — 99213 OFFICE O/P EST LOW 20 MIN: CPT

## 2022-01-06 NOTE — PHYSICAL EXAM
[Appropriately responsive] : appropriately responsive [Alert] : alert [No Acute Distress] : no acute distress [Soft] : soft [Non-tender] : non-tender [Non-distended] : non-distended [No HSM] : No HSM [No Lesions] : no lesions [No Mass] : no mass [Oriented x3] : oriented x3 [FreeTextEntry7] : Incision clean, dry, intact

## 2022-01-06 NOTE — PLAN
[FreeTextEntry1] : Contraception discussed in detail. Discussed that in context of HTN, OCPs are not recommended. LARC would be safest option for her but pt is not comfortable with having a device inside. Discussed partner vasectomy, he has previously declined. Encouraged her to revisit the discussion and provided information for urologist so he can have a consultation for information as well. Offered medicaid form so that she can have tubal ligation in the future, she declines for now. Will start progesterone only pill at this time.

## 2022-01-06 NOTE — HISTORY OF PRESENT ILLNESS
[FreeTextEntry1] : s/p repeat  on . Overall feeling well. \par Does not require pain medication anymore and vaginal bleeding is decreasing. \par No concern for depression at this time. \par Did not have tubal ligation because she didn't have the medicaid form signed; she declines signing it today. \par Re: contraception, does not want IUD and worried about weight gain with the pill. Would consider laparoscopic tubal ligation but not yet.

## 2022-01-18 ENCOUNTER — RX RENEWAL (OUTPATIENT)
Age: 37
End: 2022-01-18

## 2022-01-27 ENCOUNTER — APPOINTMENT (OUTPATIENT)
Dept: OBGYN | Facility: CLINIC | Age: 37
End: 2022-01-27
Payer: COMMERCIAL

## 2022-01-27 VITALS
DIASTOLIC BLOOD PRESSURE: 70 MMHG | WEIGHT: 215 LBS | BODY MASS INDEX: 34.55 KG/M2 | HEIGHT: 66 IN | SYSTOLIC BLOOD PRESSURE: 110 MMHG

## 2022-01-27 DIAGNOSIS — Z34.80 ENCOUNTER FOR SUPERVISION OF OTHER NORMAL PREGNANCY, UNSPECIFIED TRIMESTER: ICD-10-CM

## 2022-01-27 DIAGNOSIS — Z3A.17 17 WEEKS GESTATION OF PREGNANCY: ICD-10-CM

## 2022-01-27 DIAGNOSIS — O21.9 VOMITING OF PREGNANCY, UNSPECIFIED: ICD-10-CM

## 2022-01-27 DIAGNOSIS — O16.1 UNSPECIFIED MATERNAL HYPERTENSION, FIRST TRIMESTER: ICD-10-CM

## 2022-01-27 DIAGNOSIS — Z34.01 ENCOUNTER FOR SUPERVISION OF NORMAL FIRST PREGNANCY, FIRST TRIMESTER: ICD-10-CM

## 2022-01-27 DIAGNOSIS — Z32.00 ENCOUNTER FOR PREGNANCY TEST, RESULT UNKNOWN: ICD-10-CM

## 2022-01-27 DIAGNOSIS — Z31.430 ENCOUNTER OF FEMALE FOR TESTING FOR GENETIC DISEASE CARRIER STATUS FOR PROCREATIVE MANAGEMENT: ICD-10-CM

## 2022-01-27 DIAGNOSIS — Z3A.36 36 WEEKS GESTATION OF PREGNANCY: ICD-10-CM

## 2022-01-27 DIAGNOSIS — Z64.1 PROBLEMS RELATED TO MULTIPARITY: ICD-10-CM

## 2022-01-27 DIAGNOSIS — Z48.89 ENCOUNTER FOR OTHER SPECIFIED SURGICAL AFTERCARE: ICD-10-CM

## 2022-01-27 DIAGNOSIS — Z3A.28 28 WEEKS GESTATION OF PREGNANCY: ICD-10-CM

## 2022-01-27 DIAGNOSIS — Z3A.24 24 WEEKS GESTATION OF PREGNANCY: ICD-10-CM

## 2022-01-27 PROCEDURE — 0503F POSTPARTUM CARE VISIT: CPT

## 2022-01-27 NOTE — HISTORY OF PRESENT ILLNESS
[Complications:___] : complications include: [unfilled] [Last Pap Date: ___] : Last Pap Date: [unfilled] [Repeat C/S] : delivered by  section (repeat) [Male] : Delivery History: baby boy [Wt. ___] : weighing [unfilled] [Delivery Date: ___] : on [unfilled] [Intended Contraception] : Intended Contraception: [Clean/Dry/Intact] : clean, dry and intact [Healed] : healed [Back to Normal] : is back to normal in size [Normal] : the vagina was normal [Examination Of The Breasts] : breasts are normal [Doing Well] : is doing well [None] : None [BTL] : no tubal ligation [Breastfeeding] : not currently nursing [Resumed Menses] : has not resumed her menses [Resumed Ovid] : has not resumed intercourse [Breast Pain] : no breast pain [Heavy Bleeding] : no heavy bleeding [Incisional Pain] : no incisional pain [Cervix Sample Taken] : cervical sample not taken for a Pap smear [de-identified] : POP

## 2022-04-11 PROBLEM — Z11.59 SCREENING FOR VIRAL DISEASE: Status: ACTIVE | Noted: 2021-06-11

## 2022-05-24 ENCOUNTER — RX RENEWAL (OUTPATIENT)
Age: 37
End: 2022-05-24

## 2022-10-05 ENCOUNTER — APPOINTMENT (OUTPATIENT)
Dept: INTERNAL MEDICINE | Facility: CLINIC | Age: 37
End: 2022-10-05

## 2022-10-05 PROCEDURE — 99214 OFFICE O/P EST MOD 30 MIN: CPT

## 2022-10-05 RX ORDER — NIFEDIPINE 90 MG/1
90 TABLET, EXTENDED RELEASE ORAL
Qty: 90 | Refills: 3 | Status: DISCONTINUED | COMMUNITY
Start: 2019-01-25 | End: 2022-10-05

## 2022-10-05 RX ORDER — LABETALOL HYDROCHLORIDE 200 MG/1
200 TABLET, FILM COATED ORAL
Qty: 90 | Refills: 2 | Status: DISCONTINUED | COMMUNITY
Start: 2021-04-12 | End: 2022-10-05

## 2022-10-05 RX ORDER — NIFEDIPINE 90 MG/1
90 TABLET, EXTENDED RELEASE ORAL
Qty: 30 | Refills: 11 | Status: DISCONTINUED | COMMUNITY
Start: 2022-01-18 | End: 2022-10-05

## 2022-10-06 VITALS — DIASTOLIC BLOOD PRESSURE: 80 MMHG | SYSTOLIC BLOOD PRESSURE: 150 MMHG

## 2022-10-06 NOTE — HISTORY OF PRESENT ILLNESS
[FreeTextEntry8] : Patient is here to follow-up on her blood pressure.  She after having COVID has stopped all her blood pressure medications.  She was on\par Procardia and labetalol.  She was pregnant at the time.  However now she is off of all medication.  She does not check her blood pressure regularly at home.

## 2023-05-09 ENCOUNTER — RX RENEWAL (OUTPATIENT)
Age: 38
End: 2023-05-09

## 2023-10-12 DIAGNOSIS — J03.00 ACUTE STREPTOCOCCAL TONSILLITIS, UNSPECIFIED: ICD-10-CM

## 2023-10-25 ENCOUNTER — NON-APPOINTMENT (OUTPATIENT)
Age: 38
End: 2023-10-25

## 2023-11-09 ENCOUNTER — APPOINTMENT (OUTPATIENT)
Dept: FAMILY MEDICINE | Facility: CLINIC | Age: 38
End: 2023-11-09
Payer: COMMERCIAL

## 2023-11-09 VITALS
HEIGHT: 66 IN | TEMPERATURE: 99.1 F | OXYGEN SATURATION: 97 % | BODY MASS INDEX: 40.82 KG/M2 | HEART RATE: 105 BPM | RESPIRATION RATE: 18 BRPM | SYSTOLIC BLOOD PRESSURE: 138 MMHG | WEIGHT: 254 LBS | DIASTOLIC BLOOD PRESSURE: 84 MMHG

## 2023-11-09 DIAGNOSIS — J06.9 ACUTE UPPER RESPIRATORY INFECTION, UNSPECIFIED: ICD-10-CM

## 2023-11-09 DIAGNOSIS — J02.9 ACUTE PHARYNGITIS, UNSPECIFIED: ICD-10-CM

## 2023-11-09 PROCEDURE — 36415 COLL VENOUS BLD VENIPUNCTURE: CPT

## 2023-11-09 PROCEDURE — 87880 STREP A ASSAY W/OPTIC: CPT | Mod: QW

## 2023-11-09 PROCEDURE — 99213 OFFICE O/P EST LOW 20 MIN: CPT | Mod: 25

## 2023-11-10 LAB
BASOPHILS # BLD AUTO: 0.07 K/UL
BASOPHILS NFR BLD AUTO: 0.8 %
EBV EA AB SER IA-ACNC: <5 U/ML
EBV EA AB TITR SER IF: POSITIVE
EBV EA IGG SER QL IA: >600 U/ML
EBV EA IGG SER-ACNC: NEGATIVE
EBV EA IGM SER IA-ACNC: NEGATIVE
EBV PATRN SPEC IB-IMP: NORMAL
EBV VCA IGG SER IA-ACNC: 691 U/ML
EBV VCA IGM SER QL IA: <10 U/ML
EOSINOPHIL # BLD AUTO: 0.22 K/UL
EOSINOPHIL NFR BLD AUTO: 2.6 %
EPSTEIN-BARR VIRUS CAPSID ANTIGEN IGG: POSITIVE
HCT VFR BLD CALC: 42.1 %
HGB BLD-MCNC: 13.1 G/DL
IMM GRANULOCYTES NFR BLD AUTO: 0.4 %
LYMPHOCYTES # BLD AUTO: 1.68 K/UL
LYMPHOCYTES NFR BLD AUTO: 19.7 %
MAN DIFF?: NORMAL
MCHC RBC-ENTMCNC: 24.8 PG
MCHC RBC-ENTMCNC: 31.1 GM/DL
MCV RBC AUTO: 79.6 FL
MONOCYTES # BLD AUTO: 0.87 K/UL
MONOCYTES NFR BLD AUTO: 10.2 %
NEUTROPHILS # BLD AUTO: 5.64 K/UL
NEUTROPHILS NFR BLD AUTO: 66.3 %
PLATELET # BLD AUTO: 350 K/UL
RBC # BLD: 5.29 M/UL
RBC # FLD: 17.1 %
WBC # FLD AUTO: 8.51 K/UL

## 2023-11-12 LAB — BACTERIA THROAT CULT: NORMAL

## 2023-11-17 ENCOUNTER — RX RENEWAL (OUTPATIENT)
Age: 38
End: 2023-11-17

## 2023-11-24 ENCOUNTER — NON-APPOINTMENT (OUTPATIENT)
Age: 38
End: 2023-11-24

## 2023-11-29 ENCOUNTER — APPOINTMENT (OUTPATIENT)
Dept: INTERNAL MEDICINE | Facility: CLINIC | Age: 38
End: 2023-11-29
Payer: COMMERCIAL

## 2023-11-29 VITALS
OXYGEN SATURATION: 98 % | DIASTOLIC BLOOD PRESSURE: 80 MMHG | BODY MASS INDEX: 40.82 KG/M2 | HEIGHT: 66 IN | WEIGHT: 254 LBS | HEART RATE: 90 BPM | SYSTOLIC BLOOD PRESSURE: 148 MMHG | TEMPERATURE: 97.2 F | RESPIRATION RATE: 16 BRPM

## 2023-11-29 DIAGNOSIS — R10.9 UNSPECIFIED ABDOMINAL PAIN: ICD-10-CM

## 2023-11-29 PROCEDURE — 36415 COLL VENOUS BLD VENIPUNCTURE: CPT

## 2023-11-29 PROCEDURE — 99213 OFFICE O/P EST LOW 20 MIN: CPT | Mod: 25

## 2023-11-30 LAB
ALBUMIN SERPL ELPH-MCNC: 4.2 G/DL
ALP BLD-CCNC: 81 U/L
ALT SERPL-CCNC: 17 U/L
ANION GAP SERPL CALC-SCNC: 13 MMOL/L
AST SERPL-CCNC: 12 U/L
BASOPHILS # BLD AUTO: 0.06 K/UL
BASOPHILS NFR BLD AUTO: 0.7 %
BILIRUB SERPL-MCNC: <0.2 MG/DL
BUN SERPL-MCNC: 10 MG/DL
CALCIUM SERPL-MCNC: 9.4 MG/DL
CHLORIDE SERPL-SCNC: 105 MMOL/L
CO2 SERPL-SCNC: 21 MMOL/L
CREAT SERPL-MCNC: 0.57 MG/DL
EGFR: 119 ML/MIN/1.73M2
EOSINOPHIL # BLD AUTO: 0.17 K/UL
EOSINOPHIL NFR BLD AUTO: 2.1 %
GLUCOSE SERPL-MCNC: 107 MG/DL
HCT VFR BLD CALC: 40.4 %
HGB BLD-MCNC: 12.4 G/DL
IMM GRANULOCYTES NFR BLD AUTO: 0.4 %
LYMPHOCYTES # BLD AUTO: 2.62 K/UL
LYMPHOCYTES NFR BLD AUTO: 31.8 %
MAN DIFF?: NORMAL
MCHC RBC-ENTMCNC: 24.4 PG
MCHC RBC-ENTMCNC: 30.7 GM/DL
MCV RBC AUTO: 79.5 FL
MONOCYTES # BLD AUTO: 0.68 K/UL
MONOCYTES NFR BLD AUTO: 8.2 %
NEUTROPHILS # BLD AUTO: 4.69 K/UL
NEUTROPHILS NFR BLD AUTO: 56.8 %
PLATELET # BLD AUTO: 387 K/UL
POTASSIUM SERPL-SCNC: 4.1 MMOL/L
PROT SERPL-MCNC: 7.3 G/DL
RBC # BLD: 5.08 M/UL
RBC # FLD: 16.4 %
SODIUM SERPL-SCNC: 139 MMOL/L
WBC # FLD AUTO: 8.25 K/UL

## 2023-12-04 ENCOUNTER — RESULT REVIEW (OUTPATIENT)
Age: 38
End: 2023-12-04

## 2023-12-08 ENCOUNTER — RESULT CHARGE (OUTPATIENT)
Age: 38
End: 2023-12-08

## 2023-12-22 ENCOUNTER — APPOINTMENT (OUTPATIENT)
Dept: INTERNAL MEDICINE | Facility: CLINIC | Age: 38
End: 2023-12-22
Payer: COMMERCIAL

## 2023-12-22 VITALS
RESPIRATION RATE: 16 BRPM | HEART RATE: 88 BPM | BODY MASS INDEX: 40.18 KG/M2 | HEIGHT: 66 IN | SYSTOLIC BLOOD PRESSURE: 156 MMHG | OXYGEN SATURATION: 98 % | WEIGHT: 250 LBS | TEMPERATURE: 97.8 F | DIASTOLIC BLOOD PRESSURE: 90 MMHG

## 2023-12-22 DIAGNOSIS — Z00.00 ENCOUNTER FOR GENERAL ADULT MEDICAL EXAMINATION W/OUT ABNORMAL FINDINGS: ICD-10-CM

## 2023-12-22 PROCEDURE — 99395 PREV VISIT EST AGE 18-39: CPT | Mod: 25

## 2023-12-22 PROCEDURE — 36415 COLL VENOUS BLD VENIPUNCTURE: CPT

## 2023-12-29 PROBLEM — J06.9 VIRAL URI WITH COUGH: Status: RESOLVED | Noted: 2023-11-09 | Resolved: 2023-12-09

## 2023-12-29 PROBLEM — J02.9 ACUTE PHARYNGITIS, UNSPECIFIED ETIOLOGY: Status: ACTIVE | Noted: 2023-11-09

## 2023-12-29 LAB — S PYO AG SPEC QL IA: NEGATIVE

## 2023-12-29 NOTE — HISTORY OF PRESENT ILLNESS
[FreeTextEntry8] :  38-year-old female presents with history of sore throat x 2 days without fever or chills. (+)"Cold" symptoms and nonproductive cough. Patient has 2 small children at home ages 2 and 5 with positive throat cultures for streptococcal pharyngitis within the last few days. Patient reports no prior history of acute mononucleosis. Non-smoker.

## 2023-12-29 NOTE — REVIEW OF SYSTEMS
[Fatigue] : fatigue [Nasal Discharge] : nasal discharge [Sore Throat] : sore throat [Postnasal Drip] : postnasal drip [Cough] : cough [Negative] : Heme/Lymph [Fever] : no fever [Chills] : no chills [Recent Change In Weight] : ~T no recent weight change [Earache] : no earache [Hoarseness] : no hoarseness [Chest Pain] : no chest pain [Wheezing] : no wheezing [Shortness Of Breath] : no shortness of breath [Dyspnea on Exertion] : no dyspnea on exertion [Abdominal Pain] : no abdominal pain [Nausea] : no nausea [Hematuria] : no hematuria [Skin Rash] : no skin rash [Swollen Glands] : no swollen glands

## 2023-12-29 NOTE — PLAN
[FreeTextEntry1] : 1)-Treatment plan as outlined above. 2)-Laboratory specimens drawn in the clinic.

## 2024-01-02 ENCOUNTER — TRANSCRIPTION ENCOUNTER (OUTPATIENT)
Age: 39
End: 2024-01-02

## 2024-01-02 LAB
25(OH)D3 SERPL-MCNC: 17.2 NG/ML
ALBUMIN SERPL ELPH-MCNC: 4.3 G/DL
ALP BLD-CCNC: 90 U/L
ALT SERPL-CCNC: 14 U/L
ANION GAP SERPL CALC-SCNC: 12 MMOL/L
APPEARANCE: CLEAR
AST SERPL-CCNC: 15 U/L
BASOPHILS # BLD AUTO: 0.07 K/UL
BASOPHILS NFR BLD AUTO: 0.8 %
BILIRUB SERPL-MCNC: <0.2 MG/DL
BILIRUBIN URINE: NEGATIVE
BLOOD URINE: NEGATIVE
BUN SERPL-MCNC: 11 MG/DL
CALCIUM SERPL-MCNC: 9.5 MG/DL
CHLORIDE SERPL-SCNC: 105 MMOL/L
CHOLEST SERPL-MCNC: 228 MG/DL
CO2 SERPL-SCNC: 24 MMOL/L
COLOR: YELLOW
CREAT SERPL-MCNC: 1.04 MG/DL
EGFR: 71 ML/MIN/1.73M2
EOSINOPHIL # BLD AUTO: 0.25 K/UL
EOSINOPHIL NFR BLD AUTO: 3 %
ESTIMATED AVERAGE GLUCOSE: 114 MG/DL
GLUCOSE QUALITATIVE U: NEGATIVE MG/DL
GLUCOSE SERPL-MCNC: 99 MG/DL
HBA1C MFR BLD HPLC: 5.6 %
HCT VFR BLD CALC: 40.5 %
HDLC SERPL-MCNC: 62 MG/DL
HGB BLD-MCNC: 12.3 G/DL
IMM GRANULOCYTES NFR BLD AUTO: 0.2 %
KETONES URINE: NEGATIVE MG/DL
LDLC SERPL CALC-MCNC: 144 MG/DL
LEUKOCYTE ESTERASE URINE: NEGATIVE
LYMPHOCYTES # BLD AUTO: 2.4 K/UL
LYMPHOCYTES NFR BLD AUTO: 28.7 %
MAN DIFF?: NORMAL
MCHC RBC-ENTMCNC: 24.4 PG
MCHC RBC-ENTMCNC: 30.4 GM/DL
MCV RBC AUTO: 80.4 FL
MONOCYTES # BLD AUTO: 0.55 K/UL
MONOCYTES NFR BLD AUTO: 6.6 %
NEUTROPHILS # BLD AUTO: 5.08 K/UL
NEUTROPHILS NFR BLD AUTO: 60.7 %
NITRITE URINE: NEGATIVE
NONHDLC SERPL-MCNC: 166 MG/DL
PH URINE: 5.5
PLATELET # BLD AUTO: 394 K/UL
POTASSIUM SERPL-SCNC: 4.1 MMOL/L
PROT SERPL-MCNC: 7.4 G/DL
PROTEIN URINE: NEGATIVE MG/DL
RBC # BLD: 5.04 M/UL
RBC # FLD: 16 %
SODIUM SERPL-SCNC: 140 MMOL/L
SPECIFIC GRAVITY URINE: 1.02
T4 SERPL-MCNC: 6.2 UG/DL
TRIGL SERPL-MCNC: 124 MG/DL
TSH SERPL-ACNC: 1.64 UIU/ML
UROBILINOGEN URINE: 0.2 MG/DL
VIT B12 SERPL-MCNC: 650 PG/ML
WBC # FLD AUTO: 8.37 K/UL

## 2024-01-02 NOTE — HEALTH RISK ASSESSMENT
[Very Good] : ~his/her~  mood as very good [Yes] : Yes [Monthly or less (1 pt)] : Monthly or less (1 point) [3 or 4 (1 pt)] : 3 or 4  (1 point) [Never (0 pts)] : Never (0 points) [No] : In the past 12 months have you used drugs other than those required for medical reasons? No [No falls in past year] : Patient reported no falls in the past year [0] : 2) Feeling down, depressed, or hopeless: Not at all (0) [PHQ-2 Negative - No further assessment needed] : PHQ-2 Negative - No further assessment needed [Patient reported PAP Smear was normal] : Patient reported PAP Smear was normal [Fully functional (bathing, dressing, toileting, transferring, walking, feeding)] : Fully functional (bathing, dressing, toileting, transferring, walking, feeding) [Fully functional (using the telephone, shopping, preparing meals, housekeeping, doing laundry, using] : Fully functional and needs no help or supervision to perform IADLs (using the telephone, shopping, preparing meals, housekeeping, doing laundry, using transportation, managing medications and managing finances) [Never] : Never [Audit-CScore] : 2 [[id number]] : 0 [Smoke Detector] : smoke detector [Safety elements used in home] : safety elements used in home [PapSmearDate] : 01/22

## 2024-01-02 NOTE — HISTORY OF PRESENT ILLNESS
[FreeTextEntry1] : Annual exam.  And [de-identified] : Patient presents to the office today for an annual exam.  Currently feels fine.  We had a long extensive discussion about weight loss.  Because abdominal ultrasound showed gallstones with fatty liver disease.  Follows OB/GYN on a regular basis.  Denies any chest pains denies any abdominal pains.

## 2024-02-23 ENCOUNTER — APPOINTMENT (OUTPATIENT)
Dept: OBGYN | Facility: CLINIC | Age: 39
End: 2024-02-23
Payer: COMMERCIAL

## 2024-02-23 VITALS
BODY MASS INDEX: 39.7 KG/M2 | HEIGHT: 66 IN | WEIGHT: 247 LBS | SYSTOLIC BLOOD PRESSURE: 124 MMHG | DIASTOLIC BLOOD PRESSURE: 80 MMHG

## 2024-02-23 DIAGNOSIS — Z01.419 ENCOUNTER FOR GYNECOLOGICAL EXAMINATION (GENERAL) (ROUTINE) W/OUT ABNORMAL FINDINGS: ICD-10-CM

## 2024-02-23 PROCEDURE — 99395 PREV VISIT EST AGE 18-39: CPT

## 2024-02-23 NOTE — HISTORY OF PRESENT ILLNESS
[TextBox_4] : 37yo P2 here for annual gyn. H/o Htn, currently on Amlodipine. Followed for GB issues- not sure if it's stone and has follow up with GI currently. Reg periods without heavy bleeding.  Now is having spotting for a day at ovulation.  She is ; her boys are now 2, 6yo.  She works evenings in a restaurant.

## 2024-02-26 LAB — HPV HIGH+LOW RISK DNA PNL CVX: NOT DETECTED

## 2024-02-27 LAB — CYTOLOGY CVX/VAG DOC THIN PREP: NORMAL

## 2024-02-29 ENCOUNTER — APPOINTMENT (OUTPATIENT)
Dept: GASTROENTEROLOGY | Facility: CLINIC | Age: 39
End: 2024-02-29
Payer: COMMERCIAL

## 2024-02-29 VITALS
OXYGEN SATURATION: 97 % | WEIGHT: 247 LBS | HEART RATE: 86 BPM | DIASTOLIC BLOOD PRESSURE: 82 MMHG | SYSTOLIC BLOOD PRESSURE: 132 MMHG | BODY MASS INDEX: 39.7 KG/M2 | RESPIRATION RATE: 16 BRPM | HEIGHT: 66 IN

## 2024-02-29 PROCEDURE — 99203 OFFICE O/P NEW LOW 30 MIN: CPT

## 2024-02-29 NOTE — ASSESSMENT
[FreeTextEntry1] : Will refer to surgery for cholecystectomy, symptomatic cholelithiasis.  Thank you for referring Ms. DUVALL.  Please do not hesitate to call to further discuss his/her care.

## 2024-02-29 NOTE — HISTORY OF PRESENT ILLNESS
[FreeTextEntry1] : Ms. Cole is a pleasant 38F h/o obesity, HTN, vertigo,  who is referred by Dr. Booker for RUQ pain. She had an episode of RUQ pain after eating that radiated around to her back in later 2023. Went to urgent care, eventually underwent an abd U/S on 23 showing: - cholelithiasis without cholecystitis - fatty liver  She comes today to discuss further plans. No jaundice, icterus. No fevers, chills. Has been losing weight through dietary changes. Otherwise feels well. Does not smoke or drink. No FHx of any GI malignancies.

## 2024-03-01 ENCOUNTER — RX RENEWAL (OUTPATIENT)
Age: 39
End: 2024-03-01

## 2024-03-01 RX ORDER — AMLODIPINE BESYLATE 5 MG/1
5 TABLET ORAL DAILY
Qty: 30 | Refills: 2 | Status: ACTIVE | COMMUNITY
Start: 2022-10-05 | End: 1900-01-01

## 2024-03-27 ENCOUNTER — APPOINTMENT (OUTPATIENT)
Dept: SURGERY | Facility: CLINIC | Age: 39
End: 2024-03-27
Payer: COMMERCIAL

## 2024-03-27 VITALS
HEIGHT: 66 IN | TEMPERATURE: 97.9 F | HEART RATE: 81 BPM | OXYGEN SATURATION: 98 % | DIASTOLIC BLOOD PRESSURE: 99 MMHG | WEIGHT: 248 LBS | SYSTOLIC BLOOD PRESSURE: 156 MMHG | BODY MASS INDEX: 39.86 KG/M2

## 2024-03-27 PROCEDURE — 99203 OFFICE O/P NEW LOW 30 MIN: CPT

## 2024-03-29 NOTE — PHYSICAL EXAM
[de-identified] : Patient looks well nonicteric no distress currently [de-identified] : Nonicteric [de-identified] : Soft nontender no masses [de-identified] : Protuberant abdomen no upper abdominal scars

## 2024-03-29 NOTE — REASON FOR VISIT
[Initial Evaluation] : an initial evaluation [FreeTextEntry1] : Patient being evaluated for symptomatic cholelithiasis

## 2024-03-29 NOTE — PLAN
[FreeTextEntry1] : Plan for laparoscopic cholecystectomy I reviewed the risk and benefits of the procedure informed consent was obtained I spent approximately 35 minutes with the patient today in the office face-to-face time reviewing her documentation and documenting her visit

## 2024-03-29 NOTE — HISTORY OF PRESENT ILLNESS
[de-identified] : Patient is a 39-year-old woman who around [facility] of last year began to experience bandlike abdominal pain in the epigastrium she went to an urgent care they diagnosed her with gallstones and there was no evidence of cholecystitis pain went away on its own and it is come back only intermittently when she has dietary indiscretions she has ever had any jaundiced nor any pancreatitis  Her BMI is 39  Plan is for a robotic assisted laparoscopic cholecystectomy on April 12

## 2024-03-29 NOTE — ASSESSMENT
[FreeTextEntry1] : My impression is patient has symptomatic cholelithiasis.  She would be an excellent candidate for robotic assisted laparoscopic cholecystectomy  She will get preoperative liver function tests we will plan on doing her on 4/12/24  She will be seen by presurgical testing

## 2024-04-02 ENCOUNTER — RESULT REVIEW (OUTPATIENT)
Age: 39
End: 2024-04-02

## 2024-04-11 ENCOUNTER — RESULT REVIEW (OUTPATIENT)
Age: 39
End: 2024-04-11

## 2024-04-12 ENCOUNTER — RESULT REVIEW (OUTPATIENT)
Age: 39
End: 2024-04-12

## 2024-04-12 ENCOUNTER — APPOINTMENT (OUTPATIENT)
Dept: SURGERY | Facility: HOSPITAL | Age: 39
End: 2024-04-12

## 2024-04-12 ENCOUNTER — TRANSCRIPTION ENCOUNTER (OUTPATIENT)
Age: 39
End: 2024-04-12

## 2024-04-24 ENCOUNTER — APPOINTMENT (OUTPATIENT)
Dept: SURGERY | Facility: CLINIC | Age: 39
End: 2024-04-24
Payer: COMMERCIAL

## 2024-04-24 VITALS
HEART RATE: 86 BPM | SYSTOLIC BLOOD PRESSURE: 147 MMHG | DIASTOLIC BLOOD PRESSURE: 86 MMHG | TEMPERATURE: 98.1 F | OXYGEN SATURATION: 97 %

## 2024-04-24 DIAGNOSIS — K82.4 CHOLESTEROLOSIS OF GALLBLADDER: ICD-10-CM

## 2024-04-24 DIAGNOSIS — K80.10 CALCULUS OF GALLBLADDER WITH CHRONIC CHOLECYSTITIS W/OUT OBSTRUCTION: ICD-10-CM

## 2024-04-24 PROCEDURE — 99024 POSTOP FOLLOW-UP VISIT: CPT

## 2024-04-24 NOTE — REASON FOR VISIT
[Post Op: _________] : a [unfilled] post op visit [FreeTextEntry1] : Patient postop from robotic cholecystectomy April 12

## 2024-04-24 NOTE — PHYSICAL EXAM
[de-identified] : Looks well no distress not jaundiced [de-identified] : Abdomen soft nontender no masses

## 2024-04-24 NOTE — HISTORY OF PRESENT ILLNESS
[de-identified] : Patient doing well from her robotic cholecystectomy her port sites are healing well and she is getting back to her normal activities no evidence of infection or recurrence  Pathology held no surprises and came back just with gallstones and cholesterolosis and chronic cholecystitis

## 2024-04-25 ENCOUNTER — TRANSCRIPTION ENCOUNTER (OUTPATIENT)
Age: 39
End: 2024-04-25

## 2024-05-01 ENCOUNTER — TRANSCRIPTION ENCOUNTER (OUTPATIENT)
Age: 39
End: 2024-05-01

## 2024-06-06 ENCOUNTER — APPOINTMENT (OUTPATIENT)
Dept: INTERNAL MEDICINE | Facility: CLINIC | Age: 39
End: 2024-06-06
Payer: COMMERCIAL

## 2024-06-06 VITALS
BODY MASS INDEX: 42.16 KG/M2 | HEART RATE: 85 BPM | OXYGEN SATURATION: 99 % | DIASTOLIC BLOOD PRESSURE: 80 MMHG | TEMPERATURE: 98 F | WEIGHT: 250 LBS | HEIGHT: 64.5 IN | SYSTOLIC BLOOD PRESSURE: 140 MMHG

## 2024-06-06 VITALS — SYSTOLIC BLOOD PRESSURE: 124 MMHG | DIASTOLIC BLOOD PRESSURE: 76 MMHG

## 2024-06-06 DIAGNOSIS — I10 ESSENTIAL (PRIMARY) HYPERTENSION: ICD-10-CM

## 2024-06-06 DIAGNOSIS — R19.7 DIARRHEA, UNSPECIFIED: ICD-10-CM

## 2024-06-06 PROCEDURE — 99214 OFFICE O/P EST MOD 30 MIN: CPT

## 2024-06-06 PROCEDURE — G0444 DEPRESSION SCREEN ANNUAL: CPT | Mod: 59

## 2024-06-06 RX ORDER — ERGOCALCIFEROL 1.25 MG/1
1.25 MG CAPSULE, LIQUID FILLED ORAL
Qty: 12 | Refills: 3 | Status: DISCONTINUED | COMMUNITY
Start: 2024-01-02 | End: 2024-06-06

## 2024-06-06 RX ORDER — SEMAGLUTIDE 0.25 MG/.5ML
0.25 INJECTION, SOLUTION SUBCUTANEOUS
Qty: 4 | Refills: 0 | Status: DISCONTINUED | COMMUNITY
Start: 2024-04-25 | End: 2024-06-06

## 2024-06-06 NOTE — REVIEW OF SYSTEMS
[Fever] : no fever [Chills] : no chills [Recent Change In Weight] : ~T no recent weight change [Nasal Discharge] : no nasal discharge [Chest Pain] : no chest pain [Shortness Of Breath] : no shortness of breath [Cough] : no cough [Abdominal Pain] : abdominal pain [Nausea] : no nausea [Constipation] : no constipation [Diarrhea] : diarrhea [Vomiting] : no vomiting [Heartburn] : no heartburn [Melena] : no melena [Dysuria] : no dysuria [Hematuria] : no hematuria [Frequency] : no frequency [Vaginal Discharge] : no vaginal discharge [Dysmenorrhea] : no dysmenorrhea [FreeTextEntry7] : No blood in stool [FreeTextEntry8] : No dyspareunia

## 2024-06-06 NOTE — HEALTH RISK ASSESSMENT
[No] : In the past 12 months have you used drugs other than those required for medical reasons? No [0] : 2) Feeling down, depressed, or hopeless: Not at all (0) [PHQ-2 Negative - No further assessment needed] : PHQ-2 Negative - No further assessment needed [Never] : Never [Audit-CScore] : 0 [[id number]] : 0

## 2024-06-06 NOTE — HISTORY OF PRESENT ILLNESS
[FreeTextEntry8] : Patient states she is having nonbloody 5-6 episodes of diarrhea every day for 4 days.  She is having no nausea, vomiting, fever, chest cold.  She had midsternal indigestion 1 time that was not concerning. Patient has not been on antibiotics recently although she was in the hospital for less than 24 hours about 6 weeks ago for cholecystectomy.   There is no history of recent travel. Patient states she gets 2 out of 10 nonradiating cramping suprapubic pain that is partially relieved with the diarrhea.  The symptoms are sometimes triggered with eating followed by bloating and urge to have diarrhea. She is having no urinary or vaginal symptoms.

## 2025-01-06 ENCOUNTER — APPOINTMENT (OUTPATIENT)
Dept: INTERNAL MEDICINE | Facility: CLINIC | Age: 40
End: 2025-01-06
Payer: COMMERCIAL

## 2025-01-06 VITALS
WEIGHT: 258 LBS | RESPIRATION RATE: 16 BRPM | OXYGEN SATURATION: 97 % | HEIGHT: 64.5 IN | HEART RATE: 99 BPM | SYSTOLIC BLOOD PRESSURE: 132 MMHG | BODY MASS INDEX: 43.51 KG/M2 | TEMPERATURE: 97.2 F | DIASTOLIC BLOOD PRESSURE: 84 MMHG

## 2025-01-06 DIAGNOSIS — Z87.09 PERSONAL HISTORY OF OTHER DISEASES OF THE RESPIRATORY SYSTEM: ICD-10-CM

## 2025-01-06 DIAGNOSIS — I10 ESSENTIAL (PRIMARY) HYPERTENSION: ICD-10-CM

## 2025-01-06 DIAGNOSIS — Z12.39 ENCOUNTER FOR OTHER SCREENING FOR MALIGNANT NEOPLASM OF BREAST: ICD-10-CM

## 2025-01-06 DIAGNOSIS — Z87.898 PERSONAL HISTORY OF OTHER SPECIFIED CONDITIONS: ICD-10-CM

## 2025-01-06 DIAGNOSIS — Z11.59 ENCOUNTER FOR SCREENING FOR OTHER VIRAL DISEASES: ICD-10-CM

## 2025-01-06 DIAGNOSIS — Z79.899 OTHER LONG TERM (CURRENT) DRUG THERAPY: ICD-10-CM

## 2025-01-06 DIAGNOSIS — J34.89 OTHER SPECIFIED DISORDERS OF NOSE AND NASAL SINUSES: ICD-10-CM

## 2025-01-06 DIAGNOSIS — Z00.00 ENCOUNTER FOR GENERAL ADULT MEDICAL EXAMINATION W/OUT ABNORMAL FINDINGS: ICD-10-CM

## 2025-01-06 DIAGNOSIS — J00 ACUTE NASOPHARYNGITIS [COMMON COLD]: ICD-10-CM

## 2025-01-06 DIAGNOSIS — J03.00 ACUTE STREPTOCOCCAL TONSILLITIS, UNSPECIFIED: ICD-10-CM

## 2025-01-06 PROCEDURE — 99214 OFFICE O/P EST MOD 30 MIN: CPT | Mod: 25

## 2025-01-06 PROCEDURE — 36415 COLL VENOUS BLD VENIPUNCTURE: CPT

## 2025-01-06 PROCEDURE — 99395 PREV VISIT EST AGE 18-39: CPT

## 2025-01-06 PROCEDURE — G0444 DEPRESSION SCREEN ANNUAL: CPT | Mod: 59

## 2025-01-07 ENCOUNTER — TRANSCRIPTION ENCOUNTER (OUTPATIENT)
Age: 40
End: 2025-01-07

## 2025-01-07 DIAGNOSIS — E55.9 VITAMIN D DEFICIENCY, UNSPECIFIED: ICD-10-CM

## 2025-01-07 DIAGNOSIS — R79.89 OTHER SPECIFIED ABNORMAL FINDINGS OF BLOOD CHEMISTRY: ICD-10-CM

## 2025-01-07 LAB
25(OH)D3 SERPL-MCNC: 18.1 NG/ML
ALBUMIN SERPL ELPH-MCNC: 4.5 G/DL
ALP BLD-CCNC: 96 U/L
ALT SERPL-CCNC: 20 U/L
ANION GAP SERPL CALC-SCNC: 12 MMOL/L
AST SERPL-CCNC: 16 U/L
BASOPHILS # BLD AUTO: 0.06 K/UL
BASOPHILS NFR BLD AUTO: 0.6 %
BILIRUB SERPL-MCNC: 0.2 MG/DL
BUN SERPL-MCNC: 9 MG/DL
CALCIUM SERPL-MCNC: 9.7 MG/DL
CHLORIDE SERPL-SCNC: 106 MMOL/L
CHOLEST SERPL-MCNC: 201 MG/DL
CO2 SERPL-SCNC: 24 MMOL/L
CREAT SERPL-MCNC: 0.62 MG/DL
CREAT SPEC-SCNC: 299 MG/DL
EGFR: 116 ML/MIN/1.73M2
EOSINOPHIL # BLD AUTO: 0.2 K/UL
EOSINOPHIL NFR BLD AUTO: 2.1 %
ESTIMATED AVERAGE GLUCOSE: 117 MG/DL
GLUCOSE SERPL-MCNC: 109 MG/DL
HBA1C MFR BLD HPLC: 5.7 %
HCT VFR BLD CALC: 42.6 %
HDLC SERPL-MCNC: 53 MG/DL
HGB BLD-MCNC: 13.2 G/DL
IMM GRANULOCYTES NFR BLD AUTO: 0.4 %
LDLC SERPL CALC-MCNC: 114 MG/DL
LYMPHOCYTES # BLD AUTO: 3.37 K/UL
LYMPHOCYTES NFR BLD AUTO: 35.6 %
MAN DIFF?: NORMAL
MCHC RBC-ENTMCNC: 24.9 PG
MCHC RBC-ENTMCNC: 31 G/DL
MCV RBC AUTO: 80.2 FL
MICROALBUMIN 24H UR DL<=1MG/L-MCNC: 3.4 MG/DL
MICROALBUMIN/CREAT 24H UR-RTO: 12 MG/G
MONOCYTES # BLD AUTO: 0.74 K/UL
MONOCYTES NFR BLD AUTO: 7.8 %
NEUTROPHILS # BLD AUTO: 5.06 K/UL
NEUTROPHILS NFR BLD AUTO: 53.5 %
NONHDLC SERPL-MCNC: 148 MG/DL
PLATELET # BLD AUTO: 401 K/UL
POTASSIUM SERPL-SCNC: 4.2 MMOL/L
PROT SERPL-MCNC: 7.4 G/DL
RBC # BLD: 5.31 M/UL
RBC # FLD: 16.3 %
SODIUM SERPL-SCNC: 143 MMOL/L
TRIGL SERPL-MCNC: 198 MG/DL
WBC # FLD AUTO: 9.47 K/UL

## 2025-01-07 RX ORDER — ADHESIVE TAPE 3"X 2.3 YD
50 MCG TAPE, NON-MEDICATED TOPICAL
Qty: 90 | Refills: 0 | Status: ACTIVE | COMMUNITY
Start: 2025-01-07

## 2025-01-07 RX ORDER — ERGOCALCIFEROL 1.25 MG/1
1.25 MG CAPSULE, LIQUID FILLED ORAL
Qty: 8 | Refills: 0 | Status: COMPLETED | COMMUNITY
Start: 2025-01-07 | End: 2025-01-15

## 2025-01-08 ENCOUNTER — TRANSCRIPTION ENCOUNTER (OUTPATIENT)
Age: 40
End: 2025-01-08

## 2025-01-08 DIAGNOSIS — E78.5 HYPERLIPIDEMIA, UNSPECIFIED: ICD-10-CM

## 2025-01-08 LAB
FERRITIN SERPL-MCNC: 16 NG/ML
IRON SATN MFR SERPL: 13 %
IRON SERPL-MCNC: 53 UG/DL
TIBC SERPL-MCNC: 400 UG/DL
UIBC SERPL-MCNC: 348 UG/DL

## 2025-01-08 RX ORDER — FENOFIBRATE 48 MG/1
48 TABLET ORAL DAILY
Qty: 30 | Refills: 1 | Status: ACTIVE | COMMUNITY
Start: 2025-01-08 | End: 1900-01-01

## 2025-03-18 ENCOUNTER — RX RENEWAL (OUTPATIENT)
Age: 40
End: 2025-03-18

## 2025-04-30 ENCOUNTER — NON-APPOINTMENT (OUTPATIENT)
Age: 40
End: 2025-04-30

## 2025-05-01 ENCOUNTER — APPOINTMENT (OUTPATIENT)
Dept: FAMILY MEDICINE | Facility: CLINIC | Age: 40
End: 2025-05-01
Payer: COMMERCIAL

## 2025-05-01 VITALS
BODY MASS INDEX: 40.65 KG/M2 | HEIGHT: 65 IN | DIASTOLIC BLOOD PRESSURE: 101 MMHG | HEART RATE: 96 BPM | TEMPERATURE: 98.2 F | SYSTOLIC BLOOD PRESSURE: 151 MMHG | OXYGEN SATURATION: 96 % | RESPIRATION RATE: 16 BRPM | WEIGHT: 244 LBS

## 2025-05-01 VITALS — DIASTOLIC BLOOD PRESSURE: 89 MMHG | SYSTOLIC BLOOD PRESSURE: 132 MMHG

## 2025-05-01 DIAGNOSIS — I10 ESSENTIAL (PRIMARY) HYPERTENSION: ICD-10-CM

## 2025-05-01 DIAGNOSIS — G43.109 MIGRAINE WITH AURA, NOT INTRACTABLE, W/OUT STATUS MIGRAINOSUS: ICD-10-CM

## 2025-05-01 DIAGNOSIS — R79.89 OTHER SPECIFIED ABNORMAL FINDINGS OF BLOOD CHEMISTRY: ICD-10-CM

## 2025-05-01 DIAGNOSIS — E66.9 OBESITY, UNSPECIFIED: ICD-10-CM

## 2025-05-01 DIAGNOSIS — K82.4 CHOLESTEROLOSIS OF GALLBLADDER: ICD-10-CM

## 2025-05-01 DIAGNOSIS — D72.829 ELEVATED WHITE BLOOD CELL COUNT, UNSPECIFIED: ICD-10-CM

## 2025-05-01 DIAGNOSIS — M54.42 LUMBAGO WITH SCIATICA, LEFT SIDE: ICD-10-CM

## 2025-05-01 DIAGNOSIS — K80.10 CALCULUS OF GALLBLADDER WITH CHRONIC CHOLECYSTITIS W/OUT OBSTRUCTION: ICD-10-CM

## 2025-05-01 DIAGNOSIS — E55.9 VITAMIN D DEFICIENCY, UNSPECIFIED: ICD-10-CM

## 2025-05-01 DIAGNOSIS — R73.03 PREDIABETES.: ICD-10-CM

## 2025-05-01 DIAGNOSIS — Z64.1 PROBLEMS RELATED TO MULTIPARITY: ICD-10-CM

## 2025-05-01 PROCEDURE — 36415 COLL VENOUS BLD VENIPUNCTURE: CPT

## 2025-05-01 PROCEDURE — G2211 COMPLEX E/M VISIT ADD ON: CPT

## 2025-05-01 PROCEDURE — 99214 OFFICE O/P EST MOD 30 MIN: CPT

## 2025-05-01 RX ORDER — TIRZEPATIDE 2.5 MG/.5ML
2.5 INJECTION, SOLUTION SUBCUTANEOUS
Refills: 0 | Status: ACTIVE | COMMUNITY

## 2025-05-03 ENCOUNTER — NON-APPOINTMENT (OUTPATIENT)
Age: 40
End: 2025-05-03

## 2025-05-30 ENCOUNTER — NON-APPOINTMENT (OUTPATIENT)
Age: 40
End: 2025-05-30

## 2025-07-09 ENCOUNTER — NON-APPOINTMENT (OUTPATIENT)
Age: 40
End: 2025-07-09

## 2025-07-10 ENCOUNTER — NON-APPOINTMENT (OUTPATIENT)
Age: 40
End: 2025-07-10

## 2025-07-10 ENCOUNTER — APPOINTMENT (OUTPATIENT)
Dept: OBGYN | Facility: CLINIC | Age: 40
End: 2025-07-10

## 2025-07-10 VITALS
BODY MASS INDEX: 36.82 KG/M2 | SYSTOLIC BLOOD PRESSURE: 160 MMHG | WEIGHT: 221 LBS | HEIGHT: 65 IN | DIASTOLIC BLOOD PRESSURE: 90 MMHG

## 2025-07-10 PROBLEM — N89.8 VAGINAL DISCHARGE: Status: ACTIVE | Noted: 2025-07-10 | Resolved: 2025-07-24

## 2025-07-10 PROBLEM — N93.9 VAGINAL SPOTTING: Status: ACTIVE | Noted: 2025-07-10

## 2025-07-10 PROBLEM — Z01.419 ENCOUNTER FOR ANNUAL ROUTINE GYNECOLOGICAL EXAMINATION: Status: ACTIVE | Noted: 2021-03-26

## 2025-07-10 PROBLEM — R92.30 DENSE BREAST TISSUE: Status: ACTIVE | Noted: 2025-07-10

## 2025-07-10 PROBLEM — N93.9 ABNORMAL UTERINE BLEEDING (AUB): Status: ACTIVE | Noted: 2025-07-10 | Resolved: 2025-10-08

## 2025-07-10 PROCEDURE — 99396 PREV VISIT EST AGE 40-64: CPT

## 2025-07-10 PROCEDURE — 99386 PREV VISIT NEW AGE 40-64: CPT

## 2025-07-14 LAB
A VAGINAE DNA VAG QL NAA+PROBE: ABNORMAL
BVAB2 DNA VAG QL NAA+PROBE: NORMAL
C KRUSEI DNA VAG QL NAA+PROBE: NEGATIVE
C TRACH RRNA SPEC QL NAA+PROBE: NEGATIVE
CANDIDA DNA VAG QL NAA+PROBE: NEGATIVE
MEGA1 DNA VAG QL NAA+PROBE: NORMAL
N GONORRHOEA RRNA SPEC QL NAA+PROBE: NEGATIVE
T VAGINALIS RRNA SPEC QL NAA+PROBE: NEGATIVE

## 2025-07-14 RX ORDER — METRONIDAZOLE 7.5 MG/G
0.75 GEL VAGINAL
Qty: 1 | Refills: 0 | Status: ACTIVE | COMMUNITY
Start: 2025-07-14 | End: 1900-01-01

## 2025-08-29 ENCOUNTER — RESULT REVIEW (OUTPATIENT)
Age: 40
End: 2025-08-29